# Patient Record
Sex: MALE | Race: WHITE | NOT HISPANIC OR LATINO | URBAN - METROPOLITAN AREA
[De-identification: names, ages, dates, MRNs, and addresses within clinical notes are randomized per-mention and may not be internally consistent; named-entity substitution may affect disease eponyms.]

---

## 2017-02-24 ENCOUNTER — TELEPHONE (OUTPATIENT)
Dept: UROLOGY | Facility: CLINIC | Age: 47
End: 2017-02-24

## 2017-02-24 NOTE — TELEPHONE ENCOUNTER
Patient called and asked what labs to get done prior to surgery. Informed patient that he just needs to get an H & P and urine culture. Patient stated understanding and agreed with plan.  Rosalba Lindsay LPN

## 2017-03-09 ENCOUNTER — CARE COORDINATION (OUTPATIENT)
Dept: UROLOGY | Facility: CLINIC | Age: 47
End: 2017-03-09

## 2017-03-09 NOTE — PROGRESS NOTES
Upcoming surgical procedure with Dr Luis Eduardo Hernandez on 3.17.17 at 0715, check in at 0545.   Surgery at ([facility] or [place]): [facility]   Patient is having a Excision of penile wound  Pre-op physical completed: YES. Date-3.10.17.  PAC: No  Bowel Prep: No, not needed  Urine culture completed: No, not needed  Post-operative appointment needed: YES. Date-4.3.17 at 1030 with Dr Melgar.  All questions answered.    Patient verbalized understanding. No further questions.      Lissette Shore, RN-BC, BSN  Care Coordinator - Reconstructive Urology  [phone]

## 2017-03-16 ENCOUNTER — CARE COORDINATION (OUTPATIENT)
Dept: UROLOGY | Facility: CLINIC | Age: 47
End: 2017-03-16

## 2017-03-16 NOTE — PROGRESS NOTES
Patient does not have someone to stay with him after surgery.  He was wondering if it could be done under local anesthesia.  Discussed with Dr Hernandez. It can not be done under local.    Notified patient of recommendations from physician.  Patient does not have anyone to come with him.    Surgery was cancelled.   Patient will call back when ready to reschedule.    Lissette Shore, MARGARET-BC, BSN  Care Coordinator - Reconstructive Urology

## 2017-07-26 ENCOUNTER — TELEPHONE (OUTPATIENT)
Dept: UROLOGY | Facility: CLINIC | Age: 47
End: 2017-07-26

## 2017-07-26 NOTE — TELEPHONE ENCOUNTER
Patient called and stated he was hoping for a call back about his surgery and finding someone to stay with him after surgery. Pat Kohli LPN Staff Nurse